# Patient Record
Sex: MALE | Race: BLACK OR AFRICAN AMERICAN | NOT HISPANIC OR LATINO | Employment: UNEMPLOYED | URBAN - METROPOLITAN AREA
[De-identification: names, ages, dates, MRNs, and addresses within clinical notes are randomized per-mention and may not be internally consistent; named-entity substitution may affect disease eponyms.]

---

## 2021-02-14 ENCOUNTER — APPOINTMENT (OUTPATIENT)
Dept: CT IMAGING | Facility: HOSPITAL | Age: 51
End: 2021-02-14
Payer: COMMERCIAL

## 2021-02-14 ENCOUNTER — APPOINTMENT (EMERGENCY)
Dept: RADIOLOGY | Facility: HOSPITAL | Age: 51
End: 2021-02-14
Payer: COMMERCIAL

## 2021-02-14 ENCOUNTER — HOSPITAL ENCOUNTER (OUTPATIENT)
Facility: HOSPITAL | Age: 51
Setting detail: OBSERVATION
Discharge: HOME | End: 2021-02-15
Attending: EMERGENCY MEDICINE | Admitting: INTERNAL MEDICINE
Payer: COMMERCIAL

## 2021-02-14 DIAGNOSIS — R07.9 CHEST PAIN, UNSPECIFIED TYPE: Primary | ICD-10-CM

## 2021-02-14 DIAGNOSIS — Z87.898 HISTORY OF SEIZURES: Chronic | ICD-10-CM

## 2021-02-14 DIAGNOSIS — I10 HTN (HYPERTENSION): ICD-10-CM

## 2021-02-14 PROBLEM — F14.21 COCAINE USE DISORDER, SEVERE, IN EARLY REMISSION (HCC): Status: ACTIVE | Noted: 2021-02-14

## 2021-02-14 PROBLEM — R79.89 ELEVATED D-DIMER: Status: ACTIVE | Noted: 2021-02-14

## 2021-02-14 PROBLEM — J45.909 ASTHMA: Chronic | Status: ACTIVE | Noted: 2021-02-14

## 2021-02-14 PROBLEM — J45.909 ASTHMA: Status: ACTIVE | Noted: 2021-02-14

## 2021-02-14 PROBLEM — E78.5 HYPERLIPIDEMIA: Chronic | Status: ACTIVE | Noted: 2021-02-14

## 2021-02-14 LAB
ALBUMIN SERPL BCP-MCNC: 3.8 G/DL (ref 3.5–5)
ALP SERPL-CCNC: 103 U/L (ref 46–116)
ALT SERPL W P-5'-P-CCNC: 38 U/L (ref 12–78)
AMPHETAMINES SERPL QL SCN: NEGATIVE
ANION GAP SERPL CALCULATED.3IONS-SCNC: 6 MMOL/L (ref 4–13)
AST SERPL W P-5'-P-CCNC: 29 U/L (ref 5–45)
BARBITURATES UR QL: NEGATIVE
BASOPHILS # BLD AUTO: 0.04 THOUSANDS/ΜL (ref 0–0.1)
BASOPHILS NFR BLD AUTO: 1 % (ref 0–1)
BENZODIAZ UR QL: NEGATIVE
BILIRUB SERPL-MCNC: 0.37 MG/DL (ref 0.2–1)
BUN SERPL-MCNC: 12 MG/DL (ref 5–25)
CALCIUM SERPL-MCNC: 9.8 MG/DL (ref 8.3–10.1)
CHLORIDE SERPL-SCNC: 105 MMOL/L (ref 100–108)
CHOLEST SERPL-MCNC: 83 MG/DL (ref 50–200)
CO2 SERPL-SCNC: 28 MMOL/L (ref 21–32)
COCAINE UR QL: NEGATIVE
CREAT SERPL-MCNC: 0.78 MG/DL (ref 0.6–1.3)
D DIMER PPP FEU-MCNC: 0.9 UG/ML FEU
EOSINOPHIL # BLD AUTO: 0.09 THOUSAND/ΜL (ref 0–0.61)
EOSINOPHIL NFR BLD AUTO: 2 % (ref 0–6)
ERYTHROCYTE [DISTWIDTH] IN BLOOD BY AUTOMATED COUNT: 12.1 % (ref 11.6–15.1)
GFR SERPL CREATININE-BSD FRML MDRD: 106 ML/MIN/1.73SQ M
GLUCOSE SERPL-MCNC: 92 MG/DL (ref 65–140)
HCT VFR BLD AUTO: 41.1 % (ref 36.5–49.3)
HDLC SERPL-MCNC: 39 MG/DL
HGB BLD-MCNC: 13.7 G/DL (ref 12–17)
IMM GRANULOCYTES # BLD AUTO: 0.02 THOUSAND/UL (ref 0–0.2)
IMM GRANULOCYTES NFR BLD AUTO: 0 % (ref 0–2)
LDLC SERPL CALC-MCNC: 30 MG/DL (ref 0–100)
LYMPHOCYTES # BLD AUTO: 2.02 THOUSANDS/ΜL (ref 0.6–4.47)
LYMPHOCYTES NFR BLD AUTO: 36 % (ref 14–44)
MCH RBC QN AUTO: 30.1 PG (ref 26.8–34.3)
MCHC RBC AUTO-ENTMCNC: 33.3 G/DL (ref 31.4–37.4)
MCV RBC AUTO: 90 FL (ref 82–98)
METHADONE UR QL: NEGATIVE
MONOCYTES # BLD AUTO: 0.4 THOUSAND/ΜL (ref 0.17–1.22)
MONOCYTES NFR BLD AUTO: 7 % (ref 4–12)
NEUTROPHILS # BLD AUTO: 2.99 THOUSANDS/ΜL (ref 1.85–7.62)
NEUTS SEG NFR BLD AUTO: 54 % (ref 43–75)
NRBC BLD AUTO-RTO: 0 /100 WBCS
OPIATES UR QL SCN: NEGATIVE
OXYCODONE+OXYMORPHONE UR QL SCN: NEGATIVE
PCP UR QL: NEGATIVE
PLATELET # BLD AUTO: 176 THOUSANDS/UL (ref 149–390)
PMV BLD AUTO: 11.2 FL (ref 8.9–12.7)
POTASSIUM SERPL-SCNC: 4.1 MMOL/L (ref 3.5–5.3)
PROT SERPL-MCNC: 7.4 G/DL (ref 6.4–8.2)
RBC # BLD AUTO: 4.55 MILLION/UL (ref 3.88–5.62)
SODIUM SERPL-SCNC: 139 MMOL/L (ref 136–145)
THC UR QL: NEGATIVE
TRIGL SERPL-MCNC: 72 MG/DL
TROPONIN I SERPL-MCNC: <0.02 NG/ML
TROPONIN I SERPL-MCNC: <0.02 NG/ML
VALPROATE SERPL-MCNC: 22 UG/ML (ref 50–100)
WBC # BLD AUTO: 5.56 THOUSAND/UL (ref 4.31–10.16)

## 2021-02-14 PROCEDURE — 80061 LIPID PANEL: CPT | Performed by: INTERNAL MEDICINE

## 2021-02-14 PROCEDURE — 85379 FIBRIN DEGRADATION QUANT: CPT | Performed by: INTERNAL MEDICINE

## 2021-02-14 PROCEDURE — 99220 PR INITIAL OBSERVATION CARE/DAY 70 MINUTES: CPT | Performed by: INTERNAL MEDICINE

## 2021-02-14 PROCEDURE — 36415 COLL VENOUS BLD VENIPUNCTURE: CPT | Performed by: EMERGENCY MEDICINE

## 2021-02-14 PROCEDURE — 99285 EMERGENCY DEPT VISIT HI MDM: CPT | Performed by: EMERGENCY MEDICINE

## 2021-02-14 PROCEDURE — 80307 DRUG TEST PRSMV CHEM ANLYZR: CPT | Performed by: INTERNAL MEDICINE

## 2021-02-14 PROCEDURE — 80164 ASSAY DIPROPYLACETIC ACD TOT: CPT | Performed by: EMERGENCY MEDICINE

## 2021-02-14 PROCEDURE — 71045 X-RAY EXAM CHEST 1 VIEW: CPT

## 2021-02-14 PROCEDURE — 84484 ASSAY OF TROPONIN QUANT: CPT | Performed by: PSYCHIATRY & NEUROLOGY

## 2021-02-14 PROCEDURE — 93005 ELECTROCARDIOGRAM TRACING: CPT

## 2021-02-14 PROCEDURE — G1004 CDSM NDSC: HCPCS

## 2021-02-14 PROCEDURE — 85025 COMPLETE CBC W/AUTO DIFF WBC: CPT | Performed by: EMERGENCY MEDICINE

## 2021-02-14 PROCEDURE — 99285 EMERGENCY DEPT VISIT HI MDM: CPT

## 2021-02-14 PROCEDURE — 84484 ASSAY OF TROPONIN QUANT: CPT | Performed by: EMERGENCY MEDICINE

## 2021-02-14 PROCEDURE — 71275 CT ANGIOGRAPHY CHEST: CPT

## 2021-02-14 PROCEDURE — 80053 COMPREHEN METABOLIC PANEL: CPT | Performed by: EMERGENCY MEDICINE

## 2021-02-14 RX ORDER — ACETAMINOPHEN 325 MG/1
650 TABLET ORAL EVERY 6 HOURS PRN
Status: DISCONTINUED | OUTPATIENT
Start: 2021-02-14 | End: 2021-02-15 | Stop reason: HOSPADM

## 2021-02-14 RX ORDER — CARVEDILOL 3.12 MG/1
3.12 TABLET ORAL
Status: DISCONTINUED | OUTPATIENT
Start: 2021-02-15 | End: 2021-02-15 | Stop reason: HOSPADM

## 2021-02-14 RX ORDER — ONDANSETRON 2 MG/ML
4 INJECTION INTRAMUSCULAR; INTRAVENOUS EVERY 6 HOURS PRN
Status: DISCONTINUED | OUTPATIENT
Start: 2021-02-14 | End: 2021-02-15 | Stop reason: HOSPADM

## 2021-02-14 RX ORDER — ATORVASTATIN CALCIUM 40 MG/1
40 TABLET, FILM COATED ORAL
Status: DISCONTINUED | OUTPATIENT
Start: 2021-02-14 | End: 2021-02-15 | Stop reason: HOSPADM

## 2021-02-14 RX ORDER — ASPIRIN 81 MG/1
324 TABLET, CHEWABLE ORAL ONCE
Status: COMPLETED | OUTPATIENT
Start: 2021-02-14 | End: 2021-02-14

## 2021-02-14 RX ORDER — VALPROIC ACID 250 MG/1
250 CAPSULE, LIQUID FILLED ORAL 2 TIMES DAILY
Status: ON HOLD | COMMUNITY
End: 2021-02-15 | Stop reason: SDUPTHER

## 2021-02-14 RX ORDER — CARBAMAZEPINE 200 MG/1
200 TABLET ORAL DAILY
COMMUNITY

## 2021-02-14 RX ORDER — LEVETIRACETAM 500 MG/1
750 TABLET ORAL 2 TIMES DAILY
Status: DISCONTINUED | OUTPATIENT
Start: 2021-02-14 | End: 2021-02-15 | Stop reason: HOSPADM

## 2021-02-14 RX ORDER — ATORVASTATIN CALCIUM 40 MG/1
40 TABLET, FILM COATED ORAL
COMMUNITY

## 2021-02-14 RX ORDER — CARVEDILOL 3.12 MG/1
3.12 TABLET ORAL DAILY
COMMUNITY

## 2021-02-14 RX ORDER — VALPROIC ACID 250 MG/1
250 CAPSULE, LIQUID FILLED ORAL 2 TIMES DAILY
Status: DISCONTINUED | OUTPATIENT
Start: 2021-02-14 | End: 2021-02-15 | Stop reason: HOSPADM

## 2021-02-14 RX ORDER — LOSARTAN POTASSIUM 25 MG/1
25 TABLET ORAL DAILY
COMMUNITY

## 2021-02-14 RX ORDER — CARBAMAZEPINE 200 MG/1
200 TABLET ORAL DAILY
Status: DISCONTINUED | OUTPATIENT
Start: 2021-02-15 | End: 2021-02-15 | Stop reason: HOSPADM

## 2021-02-14 RX ORDER — ASPIRIN 81 MG/1
81 TABLET, CHEWABLE ORAL DAILY
Status: DISCONTINUED | OUTPATIENT
Start: 2021-02-15 | End: 2021-02-15 | Stop reason: HOSPADM

## 2021-02-14 RX ORDER — LORAZEPAM 2 MG/ML
1 INJECTION INTRAMUSCULAR ONCE
Status: COMPLETED | OUTPATIENT
Start: 2021-02-14 | End: 2021-02-14

## 2021-02-14 RX ORDER — LEVETIRACETAM 750 MG/1
750 TABLET ORAL 2 TIMES DAILY
COMMUNITY

## 2021-02-14 RX ORDER — LOSARTAN POTASSIUM 25 MG/1
25 TABLET ORAL DAILY
Status: DISCONTINUED | OUTPATIENT
Start: 2021-02-15 | End: 2021-02-15 | Stop reason: HOSPADM

## 2021-02-14 RX ADMIN — ASPIRIN 324 MG: 81 TABLET, CHEWABLE ORAL at 14:28

## 2021-02-14 RX ADMIN — VALPROIC ACID 250 MG: 250 CAPSULE ORAL at 20:49

## 2021-02-14 RX ADMIN — IOHEXOL 85 ML: 350 INJECTION, SOLUTION INTRAVENOUS at 21:15

## 2021-02-14 RX ADMIN — LEVETIRACETAM 750 MG: 250 TABLET, FILM COATED ORAL at 20:49

## 2021-02-14 RX ADMIN — ATORVASTATIN CALCIUM 40 MG: 40 TABLET, FILM COATED ORAL at 21:44

## 2021-02-14 NOTE — ASSESSMENT & PLAN NOTE
Home medications include Keppra and valproic acid, unknown dosages    Called caregiver, who stated Keppra 750 mg b i d , valproic acid 250 mg b i d , carbamazepine 200 mg daily    Plan:   Continue home medications

## 2021-02-14 NOTE — ASSESSMENT & PLAN NOTE
PMHx of seizures, HTN, and asthma, presented with, far left-sided, sharp, 10/10, chest pain that radiated to back and resolved after 20 minutes  Occurred in the a m , while sitting at [employer], no strenuous activity, no associated symptoms  Similar episodes twice last week  Patient stated he pulled a muscle couple weeks ago  Initial troponin negative      Plan:  · 48 hour telemetry obs  · Trend troponins  · Stress test   · Recommend baby aspirin  · Order lipid panel, hemoglobin A1c, and D-dimer

## 2021-02-14 NOTE — ED PROVIDER NOTES
History  Chief Complaint   Patient presents with    Chest Pain     Per EMS pt found in [employer] clutching his chest  While walking to the ambulance pt began screaming and screeching  Pt given 2mg of ativan by EMS  Pt reports resolved cp  Denies history   Anxiety     HPI    55 yo M hx of seizures on valproic acid, presents to the ed for eval of chest pain  Occurred just prior to arrival  Lasted 20 minutes  Patient clutching his chest at [employer]  EMS called  No seizure like activity  Pain currently not present  Sharp localized retrosternal  No current sob  States he is asymptomatic  Did have similar pain twice over the past week  No n/v  No abd pain  Has a prior hx of drug abuse, though currently denies any drug abuse, states he has been "clean for awhile " Lives in [place], no family at home  States he has a "small heart "  Family hx of heart disease  Smoked for the past several years, states he quit in march of last year  None       Past Medical History:   Diagnosis Date    Hypertension     Seizure [facility])        History reviewed  No pertinent surgical history  History reviewed  No pertinent family history  I have reviewed and agree with the history as documented  E-Cigarette/Vaping     E-Cigarette/Vaping Substances     Social History     Tobacco Use    Smoking status: Not on file   Substance Use Topics    Alcohol use: Not Currently    Drug use: Not Currently       Review of Systems   Constitutional: Negative for chills, fatigue and fever  HENT: Negative for nosebleeds and sore throat  Eyes: Negative for redness and visual disturbance  Respiratory: Negative for shortness of breath and wheezing  Cardiovascular: Positive for chest pain  Negative for palpitations  Gastrointestinal: Negative for abdominal pain and diarrhea  Endocrine: Negative for polyuria  Genitourinary: Negative for difficulty urinating and testicular pain     Musculoskeletal: Negative for back pain and neck stiffness  Skin: Negative for rash and wound  Neurological: Negative for seizures, speech difficulty and headaches  Psychiatric/Behavioral: Negative for dysphoric mood and hallucinations  The patient is nervous/anxious  All other systems reviewed and are negative  Physical Exam  Physical Exam  Vitals signs and nursing note reviewed  Constitutional:       Appearance: He is well-developed  He is obese  HENT:      Head: Normocephalic and atraumatic  Right Ear: External ear normal       Left Ear: External ear normal    Eyes:      Conjunctiva/sclera: Conjunctivae normal    Neck:      Musculoskeletal: Normal range of motion  Cardiovascular:      Rate and Rhythm: Normal rate and regular rhythm  Heart sounds: Normal heart sounds  Pulmonary:      Effort: Pulmonary effort is normal       Breath sounds: Normal breath sounds  No wheezing  Chest:      Chest wall: No tenderness  Abdominal:      General: Bowel sounds are normal       Palpations: Abdomen is soft  Tenderness: There is no abdominal tenderness  There is no guarding  Musculoskeletal: Normal range of motion  Skin:     General: Skin is warm and dry  Findings: No rash  Neurological:      Mental Status: He is alert and oriented to person, place, and time  Cranial Nerves: No cranial nerve deficit  Sensory: No sensory deficit  Motor: No abnormal muscle tone        Coordination: Coordination normal          Vital Signs  ED Triage Vitals [02/14/21 1357]   Temperature Pulse Respirations Blood Pressure SpO2   98 7 °F (37 1 °C) 64 16 112/67 100 %      Temp Source Heart Rate Source Patient Position - Orthostatic VS BP Location FiO2 (%)   Oral Monitor Lying Right arm --      Pain Score       No Pain           Vitals:    02/14/21 1357   BP: 112/67   Pulse: 64   Patient Position - Orthostatic VS: Lying         Visual Acuity      ED Medications  Medications   LORazepam (FOR EMS ONLY) (ATIVAN) 2 mg/mL injection 2 mg (has no administration in time range)   aspirin chewable tablet 324 mg (324 mg Oral Given 2/14/21 1428)       Diagnostic Studies  Results Reviewed     Procedure Component Value Units Date/Time    Rapid drug screen, urine [[id number]]     Lab Status: No result Specimen: Urine     Troponin I [[id number]]  (Normal) Collected: 02/14/21 1454    Lab Status: Final result Specimen: Blood from Arm, Left Updated: 02/14/21 1542     Troponin I <0 02 ng/mL     Comprehensive metabolic panel [[id number]] Collected: 02/14/21 1454    Lab Status: Final result Specimen: Blood from Arm, Left Updated: 02/14/21 1541     Sodium 139 mmol/L      Potassium 4 1 mmol/L      Chloride 105 mmol/L      CO2 28 mmol/L      ANION GAP 6 mmol/L      BUN 12 mg/dL      Creatinine 0 78 mg/dL      Glucose 92 mg/dL      Calcium 9 8 mg/dL      AST 29 U/L      ALT 38 U/L      Alkaline Phosphatase 103 U/L      Total Protein 7 4 g/dL      Albumin 3 8 g/dL      Total Bilirubin 0 37 mg/dL      eGFR 106 ml/min/1 73sq m     Narrative:      [place] guidelines for Chronic Kidney Disease (CKD):     Stage 1 with normal or high GFR (GFR > 90 mL/min/1 73 square meters)    Stage 2 Mild CKD (GFR = 60-89 mL/min/1 73 square meters)    Stage 3A Moderate CKD (GFR = 45-59 mL/min/1 73 square meters)    Stage 3B Moderate CKD (GFR = 30-44 mL/min/1 73 square meters)    Stage 4 Severe CKD (GFR = 15-29 mL/min/1 73 square meters)    Stage 5 End Stage CKD (GFR <15 mL/min/1 73 square meters)  Note: GFR calculation is accurate only with a steady state creatinine    Valproic acid level, total [[id number]]  (Abnormal) Collected: 02/14/21 1454    Lab Status: Final result Specimen: Blood from Arm, Left Updated: 02/14/21 1522     Valproic Acid, Total 22 ug/mL     CBC and differential [[id number]] Collected: 02/14/21 1454    Lab Status: Final result Specimen: Blood from Arm, Left Updated: 02/14/21 1510     WBC 5 56 Thousand/uL      RBC 4 55 Million/uL      Hemoglobin 13 7 g/dL      Hematocrit 41 1 %      MCV 90 fL      MCH 30 1 pg      MCHC 33 3 g/dL      RDW 12 1 %      MPV 11 2 fL      Platelets 585 Thousands/uL      nRBC 0 /100 WBCs      Neutrophils Relative 54 %      Immat GRANS % 0 %      Lymphocytes Relative 36 %      Monocytes Relative 7 %      Eosinophils Relative 2 %      Basophils Relative 1 %      Neutrophils Absolute 2 99 Thousands/µL      Immature Grans Absolute 0 02 Thousand/uL      Lymphocytes Absolute 2 02 Thousands/µL      Monocytes Absolute 0 40 Thousand/µL      Eosinophils Absolute 0 09 Thousand/µL      Basophils Absolute 0 04 Thousands/µL                  XR chest 1 view portable    (Results Pending)              Procedures  Procedures       ED from 2/14/2021 in [facility] Emergency Department  02/14/21  1420          Heart Score Risk Calculator   History  2          ECG  1          Age  1          Risk Factors  1          Troponin  0          HEART Score  5 (calculated)                ED Course  ED Course as of Feb 14 1716   Sun Feb 14, 2021   1543 Troponin I: <0 02   1543 EKG: multiple T wave inversions II III, V3 - V6                MDM    55 yo M presenting with chest pain  Heart score 5  Patient had neg trop, but multiple t wave inversions in V3-V5  No prior ekg  Will require admission, chest pain obs  Admitted to medicine for further management  Disposition  Final diagnoses:   Chest pain, unspecified type     Time reflects when diagnosis was documented in both MDM as applicable and the Disposition within this note     Time User Action Codes Description Comment    2/14/2021  3:53 PM Brayan Scott Add [R07 9] Chest pain, unspecified type       ED Disposition     ED Disposition Condition Date/Time Comment    Admit Stable Laquita Feb 14, 2021  3:53 PM Case was discussed with CONNOR and the patient's admission status was agreed to be Admission Status: observation status to the service of Dr Teetee Saba          Follow-up Information    None Patient's Medications    No medications on file     No discharge procedures on file      PDMP Review     None          ED Provider  Electronically Signed by           Quita Dandy, MD  02/14/21 6859

## 2021-02-14 NOTE — ASSESSMENT & PLAN NOTE
Home medications include "BP and water pill"  Caregivers states carvedilol 3 125 mg daily and losartan 25 mg daily    Plan:   Continue home medications

## 2021-02-14 NOTE — ASSESSMENT & PLAN NOTE
Patient stated no medications currently    Plan:   Call made to pharmacy, unable to reach a pharmacist at this hour  Will need to call "[address] [place] [address] in [facility]" - [phone]?

## 2021-02-14 NOTE — H&P
H&P- Hutchinskalyn Ruano 1970, 54 y o  male MRN: [id number]    Unit/Bed#: ED 15 Encounter: [id number]    Primary Care Provider: No primary care provider on file  Date and time admitted to hospital: 2/14/2021  1:54 PM        * Chest pain  Assessment & Plan  PMHx of seizures, HTN, and asthma, presented with, far left-sided, sharp, 10/10, chest pain that radiated to back and resolved after 20 minutes  Occurred in the a m , while sitting at [employer], no strenuous activity, no associated symptoms  Similar episodes twice last week  Patient stated he pulled a muscle couple weeks ago  Initial troponin negative  Plan:  · 48 hour telemetry obs  · Trend troponins  · Stress test   · Recommend baby aspirin  · Order lipid panel, hemoglobin A1c, and D-dimer    History of seizures  Assessment & Plan  Home medications include Keppra and valproic acid, unknown dosages  Called caregiver, who stated Keppra 750 mg b i d , valproic acid 250 mg b i d , carbamazepine 200 mg daily    Plan:   Continue home medications    Hyperlipidemia  Assessment & Plan  Home medication atorvastatin 40 mg at bedtime, will continue    Asthma  Assessment & Plan  Patient stated no medications currently    Plan:   Call made to pharmacy, unable to reach a pharmacist at this hour  Will need to call "[address] [place] [address] in [facility]" - [phone]? HTN (hypertension)  Assessment & Plan  Home medications include "BP and water pill"  Caregivers states carvedilol 3 125 mg daily and losartan 25 mg daily    Plan:   Continue home medications    Cocaine use disorder, severe, in early remission [facility])  Assessment & Plan  Long crack cocaine abuse history, stopped 3 months ago, and 1 past psychiatric hospitalization after suicidal ideations to overdose on medications  Caregiver stated no psychiatric medications or diagnoses  Patient currently does not appear to be a harm to himself or to others and consents for safety      Elevated d-dimer  Assessment & Plan  Ordered CTA PE study      VTE Prophylaxis: low VTE score  / reason for no mechanical VTE prophylaxis low VTE score and order for ambulate patient and activity as tolerated   Code Status: Level 1  POLST: There is no POLST form on file for this patient (pre-hospital)    Anticipated Length of Stay:  Patient will be admitted on an Observation basis with an anticipated length of stay of  <2 midnights  Justification for Hospital Stay: chest pain    Chief Complaint:   "My chest started hurting "    History of Present Illness:    Vanessa Foley is a 54 y o   male, with a past medical history of seizures, hypertension, asthma, and crack cocaine use disorder, who presents with chest pain  The patient stated this chest pain began in the morning while at [employer] and while just sitting  The patient described the pain as sharp, 10/10, localized to the far left hand radiating to the back  The patient stated that the chest pain self resolved after 20 minutes  Patient reports similar episodes twice in the past week  Patient reports there were no inciting events, and nothing that made the pain worse or better  Patient stated he may have pulled a muscle couple weeks ago  Patient quit smoking in March 2020  Patient is unsure of his pharmacy location and stated it is just [facility] in [place]  Patient is unsure of the names and dosages of his medications for seizures and hypertension  Patient reported no allergies  Patient was hypersomnolent and could not tolerate much of the interview, due to recently being administered 2 mg Ativan by EMS  Medications and past medical history were confirmed with Caregiver Patricia Althea, number in chart  Caregiver stated he begin taking care of the patient after the patient stopped abusing crack cocaine  Review of Systems:    Review of Systems   Constitutional: Positive for fatigue  Negative for activity change, appetite change, chills, diaphoresis and fever  HENT: Negative for ear pain and sore throat  Eyes: Negative for pain and visual disturbance  Respiratory: Negative for cough and shortness of breath  Cardiovascular: Negative for chest pain and palpitations  Gastrointestinal: Negative for abdominal pain, constipation, diarrhea and vomiting  Endocrine: Negative for polyuria  Genitourinary: Negative for dysuria, flank pain and hematuria  Musculoskeletal: Negative for arthralgias, back pain and neck pain  Skin: Negative for color change and rash  Neurological: Negative for seizures, syncope and headaches  Psychiatric/Behavioral: Negative for dysphoric mood  The patient is not nervous/anxious  All other systems reviewed and are negative  Past Medical and Surgical History:     Past Medical History:   Diagnosis Date    Asthma     Hypertension     Seizure [facility])        History reviewed  No pertinent surgical history  Meds/Allergies:    Prior to Admission medications    Not on File     I have reviewed home medications with patient family member      Allergies: No Known Allergies    Social History:     Marital Status: Single   Occupation:  Unknown  Patient Pre-hospital Living Situation:  Independent  Patient Pre-hospital Level of Mobility:  Independent  Patient Pre-hospital Diet Restrictions:  Cardiac diet  Substance Use History:   Social History     Substance and Sexual Activity   Alcohol Use Not Currently     Social History     Tobacco Use   Smoking Status Former Smoker    Packs/day: 1 00    Years: 16 00    Pack years: 16 00     Social History     Substance and Sexual Activity   Drug Use Not Currently       Family History:    Family History   Problem Relation Age of Onset    Heart disease Mother     Heart disease Father        Physical Exam:     Vitals:   Blood Pressure: 121/67 (02/14/21 1919)  Pulse: 60 (02/14/21 1919)  Temperature: 98 7 °F (37 1 °C) (02/14/21 1357)  Temp Source: Oral (02/14/21 1357)  Respirations: 16 (02/14/21 1919)  SpO2: 100 % (02/14/21 1919)    Physical Exam  Vitals signs reviewed  Constitutional:       General: He is not in acute distress  Appearance: Normal appearance  He is well-developed  He is obese  He is not ill-appearing, toxic-appearing or diaphoretic  HENT:      Head: Normocephalic and atraumatic  Right Ear: External ear normal       Left Ear: External ear normal       Nose: Nose normal  No congestion  Mouth/Throat:      Mouth: Mucous membranes are moist       Pharynx: Oropharynx is clear  Eyes:      General: No scleral icterus  Right eye: No discharge  Left eye: No discharge  Extraocular Movements: Extraocular movements intact  Conjunctiva/sclera: Conjunctivae normal       Pupils: Pupils are equal, round, and reactive to light  Neck:      Musculoskeletal: Normal range of motion and neck supple  Thyroid: No thyromegaly  Vascular: No JVD  Cardiovascular:      Rate and Rhythm: Normal rate and regular rhythm  Heart sounds: Normal heart sounds  No murmur  No friction rub  No gallop  Pulmonary:      Effort: Pulmonary effort is normal  No respiratory distress  Breath sounds: Normal breath sounds  No wheezing or rales  Abdominal:      General: Bowel sounds are normal  There is no distension  Palpations: Abdomen is soft  There is no mass  Tenderness: There is no abdominal tenderness  There is no guarding  Musculoskeletal: Normal range of motion  General: No swelling or tenderness  Right lower leg: No edema  Left lower leg: No edema  Lymphadenopathy:      Cervical: No cervical adenopathy  Skin:     General: Skin is warm and dry  Capillary Refill: Capillary refill takes less than 2 seconds  Coloration: Skin is not pale  Findings: No erythema  Neurological:      General: No focal deficit present  Mental Status: He is alert and oriented to person, place, and time  Motor: No weakness  Gait: Gait normal    Psychiatric:         Mood and Affect: Mood normal          Behavior: Behavior normal          Thought Content: Thought content normal          Judgment: Judgment normal              Additional Data:     Lab Results: I have personally reviewed pertinent reports  Results from last 7 days   Lab Units 02/14/21  1454   WBC Thousand/uL 5 56   HEMOGLOBIN g/dL 13 7   HEMATOCRIT % 41 1   PLATELETS Thousands/uL 176   NEUTROS PCT % 54   LYMPHS PCT % 36   MONOS PCT % 7   EOS PCT % 2     Results from last 7 days   Lab Units 02/14/21  1454   POTASSIUM mmol/L 4 1   CHLORIDE mmol/L 105   CO2 mmol/L 28   BUN mg/dL 12   CREATININE mg/dL 0 78   CALCIUM mg/dL 9 8   ALK PHOS U/L 103   ALT U/L 38   AST U/L 29           Imaging: I have personally reviewed pertinent films in PACS    No results found  EKG, Pathology, and Other Studies Reviewed on Admission:   · EKG:  T-wave inversions in leads 2, 3, V3-V6    Epic / Care Everywhere Records Reviewed: Yes, no history in chart     ** Please Note: This note has been constructed using a voice recognition system   **

## 2021-02-15 ENCOUNTER — APPOINTMENT (OUTPATIENT)
Dept: NUCLEAR MEDICINE | Facility: HOSPITAL | Age: 51
End: 2021-02-15
Payer: COMMERCIAL

## 2021-02-15 ENCOUNTER — APPOINTMENT (OUTPATIENT)
Dept: NON INVASIVE DIAGNOSTICS | Facility: HOSPITAL | Age: 51
End: 2021-02-15
Payer: COMMERCIAL

## 2021-02-15 VITALS
WEIGHT: 177.47 LBS | OXYGEN SATURATION: 97 % | DIASTOLIC BLOOD PRESSURE: 59 MMHG | RESPIRATION RATE: 18 BRPM | HEART RATE: 68 BPM | TEMPERATURE: 98.5 F | HEIGHT: 69 IN | SYSTOLIC BLOOD PRESSURE: 121 MMHG | BODY MASS INDEX: 26.29 KG/M2

## 2021-02-15 LAB
ANION GAP SERPL CALCULATED.3IONS-SCNC: 8 MMOL/L (ref 4–13)
ATRIAL RATE: 67 BPM
BASOPHILS # BLD AUTO: 0.03 THOUSANDS/ΜL (ref 0–0.1)
BASOPHILS NFR BLD AUTO: 1 % (ref 0–1)
BUN SERPL-MCNC: 12 MG/DL (ref 5–25)
CALCIUM SERPL-MCNC: 9.8 MG/DL (ref 8.3–10.1)
CHLORIDE SERPL-SCNC: 106 MMOL/L (ref 100–108)
CHOLEST SERPL-MCNC: 77 MG/DL (ref 50–200)
CO2 SERPL-SCNC: 28 MMOL/L (ref 21–32)
CREAT SERPL-MCNC: 0.71 MG/DL (ref 0.6–1.3)
D DIMER PPP FEU-MCNC: 1.05 UG/ML FEU
EOSINOPHIL # BLD AUTO: 0.08 THOUSAND/ΜL (ref 0–0.61)
EOSINOPHIL NFR BLD AUTO: 2 % (ref 0–6)
ERYTHROCYTE [DISTWIDTH] IN BLOOD BY AUTOMATED COUNT: 12.2 % (ref 11.6–15.1)
EST. AVERAGE GLUCOSE BLD GHB EST-MCNC: 111 MG/DL
FLUAV RNA RESP QL NAA+PROBE: NEGATIVE
FLUBV RNA RESP QL NAA+PROBE: NEGATIVE
GFR SERPL CREATININE-BSD FRML MDRD: 127 ML/MIN/1.73SQ M
GLUCOSE SERPL-MCNC: 90 MG/DL (ref 65–140)
HBA1C MFR BLD: 5.5 %
HCT VFR BLD AUTO: 41 % (ref 36.5–49.3)
HDLC SERPL-MCNC: 37 MG/DL
HGB BLD-MCNC: 13.5 G/DL (ref 12–17)
IMM GRANULOCYTES # BLD AUTO: 0.01 THOUSAND/UL (ref 0–0.2)
IMM GRANULOCYTES NFR BLD AUTO: 0 % (ref 0–2)
LDLC SERPL CALC-MCNC: 29 MG/DL (ref 0–100)
LYMPHOCYTES # BLD AUTO: 1.94 THOUSANDS/ΜL (ref 0.6–4.47)
LYMPHOCYTES NFR BLD AUTO: 42 % (ref 14–44)
MCH RBC QN AUTO: 29.9 PG (ref 26.8–34.3)
MCHC RBC AUTO-ENTMCNC: 32.9 G/DL (ref 31.4–37.4)
MCV RBC AUTO: 91 FL (ref 82–98)
MONOCYTES # BLD AUTO: 0.34 THOUSAND/ΜL (ref 0.17–1.22)
MONOCYTES NFR BLD AUTO: 7 % (ref 4–12)
NEUTROPHILS # BLD AUTO: 2.18 THOUSANDS/ΜL (ref 1.85–7.62)
NEUTS SEG NFR BLD AUTO: 48 % (ref 43–75)
NONHDLC SERPL-MCNC: 40 MG/DL
NRBC BLD AUTO-RTO: 0 /100 WBCS
P AXIS: 44 DEGREES
PLATELET # BLD AUTO: 183 THOUSANDS/UL (ref 149–390)
PMV BLD AUTO: 10.8 FL (ref 8.9–12.7)
POTASSIUM SERPL-SCNC: 3.8 MMOL/L (ref 3.5–5.3)
PR INTERVAL: 184 MS
PROCALCITONIN SERPL-MCNC: <0.05 NG/ML
QRS AXIS: -4 DEGREES
QRSD INTERVAL: 90 MS
QT INTERVAL: 394 MS
QTC INTERVAL: 416 MS
RBC # BLD AUTO: 4.51 MILLION/UL (ref 3.88–5.62)
RSV RNA RESP QL NAA+PROBE: NEGATIVE
SARS-COV-2 RNA RESP QL NAA+PROBE: NEGATIVE
SODIUM SERPL-SCNC: 142 MMOL/L (ref 136–145)
T WAVE AXIS: -22 DEGREES
TRIGL SERPL-MCNC: 57 MG/DL
TROPONIN I SERPL-MCNC: 0.02 NG/ML
TROPONIN I SERPL-MCNC: <0.02 NG/ML
VALPROATE SERPL-MCNC: 89 UG/ML (ref 50–100)
VENTRICULAR RATE: 67 BPM
WBC # BLD AUTO: 4.58 THOUSAND/UL (ref 4.31–10.16)

## 2021-02-15 PROCEDURE — 78452 HT MUSCLE IMAGE SPECT MULT: CPT | Performed by: INTERNAL MEDICINE

## 2021-02-15 PROCEDURE — 93017 CV STRESS TEST TRACING ONLY: CPT

## 2021-02-15 PROCEDURE — 84484 ASSAY OF TROPONIN QUANT: CPT | Performed by: INTERNAL MEDICINE

## 2021-02-15 PROCEDURE — 93018 CV STRESS TEST I&R ONLY: CPT | Performed by: INTERNAL MEDICINE

## 2021-02-15 PROCEDURE — 80164 ASSAY DIPROPYLACETIC ACD TOT: CPT | Performed by: INTERNAL MEDICINE

## 2021-02-15 PROCEDURE — G1004 CDSM NDSC: HCPCS

## 2021-02-15 PROCEDURE — 99217 PR OBSERVATION CARE DISCHARGE MANAGEMENT: CPT | Performed by: INTERNAL MEDICINE

## 2021-02-15 PROCEDURE — A9502 TC99M TETROFOSMIN: HCPCS

## 2021-02-15 PROCEDURE — 93016 CV STRESS TEST SUPVJ ONLY: CPT | Performed by: INTERNAL MEDICINE

## 2021-02-15 PROCEDURE — 78452 HT MUSCLE IMAGE SPECT MULT: CPT

## 2021-02-15 PROCEDURE — 85379 FIBRIN DEGRADATION QUANT: CPT | Performed by: INTERNAL MEDICINE

## 2021-02-15 PROCEDURE — 83036 HEMOGLOBIN GLYCOSYLATED A1C: CPT | Performed by: INTERNAL MEDICINE

## 2021-02-15 PROCEDURE — 36415 COLL VENOUS BLD VENIPUNCTURE: CPT | Performed by: PSYCHIATRY & NEUROLOGY

## 2021-02-15 PROCEDURE — 93306 TTE W/DOPPLER COMPLETE: CPT

## 2021-02-15 PROCEDURE — 80061 LIPID PANEL: CPT | Performed by: INTERNAL MEDICINE

## 2021-02-15 PROCEDURE — 0241U HB NFCT DS VIR RESP RNA 4 TRGT: CPT | Performed by: INTERNAL MEDICINE

## 2021-02-15 PROCEDURE — 93306 TTE W/DOPPLER COMPLETE: CPT | Performed by: INTERNAL MEDICINE

## 2021-02-15 PROCEDURE — 93010 ELECTROCARDIOGRAM REPORT: CPT | Performed by: INTERNAL MEDICINE

## 2021-02-15 PROCEDURE — 93005 ELECTROCARDIOGRAM TRACING: CPT

## 2021-02-15 PROCEDURE — 84484 ASSAY OF TROPONIN QUANT: CPT | Performed by: PSYCHIATRY & NEUROLOGY

## 2021-02-15 PROCEDURE — 84145 PROCALCITONIN (PCT): CPT | Performed by: INTERNAL MEDICINE

## 2021-02-15 PROCEDURE — 85025 COMPLETE CBC W/AUTO DIFF WBC: CPT | Performed by: INTERNAL MEDICINE

## 2021-02-15 PROCEDURE — 99204 OFFICE O/P NEW MOD 45 MIN: CPT | Performed by: INTERNAL MEDICINE

## 2021-02-15 PROCEDURE — 80048 BASIC METABOLIC PNL TOTAL CA: CPT | Performed by: INTERNAL MEDICINE

## 2021-02-15 RX ORDER — ASPIRIN 81 MG/1
81 TABLET, CHEWABLE ORAL DAILY
Refills: 0
Start: 2021-02-16 | End: 2021-03-18

## 2021-02-15 RX ORDER — ASPIRIN 81 MG/1
81 TABLET, CHEWABLE ORAL DAILY
Refills: 0
Start: 2021-02-16 | End: 2021-02-15

## 2021-02-15 RX ORDER — VALPROIC ACID 250 MG/1
500 CAPSULE, LIQUID FILLED ORAL 2 TIMES DAILY
Refills: 0
Start: 2021-02-15

## 2021-02-15 RX ORDER — VALPROIC ACID 250 MG/1
1250 CAPSULE, LIQUID FILLED ORAL ONCE
Status: COMPLETED | OUTPATIENT
Start: 2021-02-15 | End: 2021-02-15

## 2021-02-15 RX ADMIN — LEVETIRACETAM 750 MG: 250 TABLET, FILM COATED ORAL at 18:17

## 2021-02-15 RX ADMIN — REGADENOSON 0.4 MG: 0.08 INJECTION, SOLUTION INTRAVENOUS at 11:27

## 2021-02-15 RX ADMIN — LEVETIRACETAM 750 MG: 250 TABLET, FILM COATED ORAL at 09:19

## 2021-02-15 RX ADMIN — VALPROIC ACID 1250 MG: 250 CAPSULE ORAL at 14:21

## 2021-02-15 RX ADMIN — CARBAMAZEPINE 200 MG: 200 TABLET ORAL at 09:20

## 2021-02-15 RX ADMIN — VALPROIC ACID 250 MG: 250 CAPSULE ORAL at 18:17

## 2021-02-15 RX ADMIN — ASPIRIN 81 MG: 81 TABLET, CHEWABLE ORAL at 09:19

## 2021-02-15 RX ADMIN — VALPROIC ACID 250 MG: 250 CAPSULE ORAL at 09:19

## 2021-02-15 NOTE — CONSULTS
Consultation - Cardiology Team One  Marko Waldron 54 y o  male MRN: [id number]  Unit/Bed#: S -01 Encounter: [id number]    Consults    Physician Requesting Consult: Muriel Mancini MD  Reason for Consult / Principal Problem:  Chest pain    Assessment:    1  Atypical Chest pain:  Presents with left lateral sharp 10/10 pain with radiation to the back that lasted 20 minutes while sitting at [employer]  Pain is worse with taking a deep breath and movement of his left arm that likely represents musculoskeletal pain  It is reproducible to palpation  Troponin negative x4  EKG revealed sinus rhythm with inferolateral T-wave abnormality  Echocardiogram and nuclear stress test ordered by primary team   2  Essential hypertension:  Average BP on carvedilol 3 125 mg and losartan 25 mg  3  Hyperlipidemia: TC 77, TG 57, HDL 37, on atorvastatin 40 mg daily  4  Seizure disorder:  Maintained on Keppra, valproic acid and carbamazepine  5  History of cocaine abuse:  Reports stopping 3 months ago  Tox screen negative  6  Elevated D-dimer:  CTA negative for PE       Plan/Recommendations:  · Atypical likely noncardiac chest pain  · Follow-up on nuclear stress test and echocardiogram ordered by primary team  · If studies unremarkable no further workup indicated  · Continue aspirin, beta-blocker and statin     ________________________________________________________    CC:  Chest pain      History of Present Illness   HPI: Marko Waldron is a 54y o  year old male who has seizure disorder, anxiety, essential hypertension, hyperlipidemia, history of cocaine use quitting 3 months ago presented to the emergency room at [facility] on 02/14/2021 via EMS with complaints chest pain that occurred while sitting at [employer]  Patient was noted clutching his chest with 10/10 sharp pain that radiated to his back  While walking to the ambulance patient began screaming and was given 2 mg Ativan with resolution of pain  Pain lasted approximately 20 minutes  On arrival to the ED patient's vital signs were stable with oxygen saturation 100% on room air  In the ED EKG revealed sinus rhythm with nonspecific inferolateral T-wave abnormality  Chest x-ray with no acute cardiopulmonary disease  Labs revealed stable CMP, stable CBC, negative troponin x4, TC 77, TG 57, HDL 37, LDL 29, D-dimer 0 90  Negative for COVID-19/RSV/influenza  Tox screen negative  CTA was negative for PE  An echocardiogram and nuclear stress test was ordered by primary team   Cardiology has been consulted for further evaluation management of chest pain  Home medication regimen includes atorvastatin 40 mg daily, carvedilol 3 125 mg daily, losartan 25 mg daily  Patient resting in bed during consultation and denies any current chest pain  Patient states over the last 5-6 months he will occasionally get this chest pain  He then states that actually he has been having it on and off since he was a kid  It can occur at any time  It is worse with taking a deep breath in and movement of his left arm  It is reproducible with palpation  He denies any heavy lifting a muscle strain  He denies any shortness breath, palpitations, lightheadedness, dizziness, syncope number near-syncope, orthopnea, PND or lower extremity edema  Social history:  Smokes 3 cigarettes daily  Drinks alcohol socially  History of cocaine use quitting 3 months ago  Family history:  No family history of CAD or sudden cardiac death      EKG reviewed personally: 2/15/2021-sinus rhythm at 75 beats per minute with inferolateral T-wave abnormality that is more pronounced when compared to EKG from 02/14/2021  Telemetry reviewed personally:  Normal sinus rhythm        Review of Systems   Constitution: Negative  Negative for chills  Cardiovascular: Positive for chest pain   Negative for dyspnea on exertion, leg swelling, near-syncope, orthopnea, palpitations, paroxysmal nocturnal dyspnea and syncope  Respiratory: Negative  Negative for cough, shortness of breath and wheezing  Endocrine: Negative  Hematologic/Lymphatic: Negative  Skin: Negative  Musculoskeletal: Negative  Gastrointestinal: Negative  Negative for diarrhea, nausea and vomiting  Neurological: Negative for dizziness, light-headedness and weakness  Psychiatric/Behavioral: Negative  Negative for altered mental status  All other systems reviewed and are negative  Historical Information   Past Medical History:   Diagnosis Date    Asthma     Hypertension     Seizure [facility])      History reviewed  No pertinent surgical history  Social History     Substance and Sexual Activity   Alcohol Use Not Currently     Social History     Substance and Sexual Activity   Drug Use Not Currently     Social History     Tobacco Use   Smoking Status Former Smoker    Packs/day: 1 00    Years: 16 00    Pack years: 16 00     Family History:   Family History   Problem Relation Age of Onset    Heart disease Mother     Heart disease Father        Meds/Allergies   all current active meds have been reviewed, current meds:   Current Facility-Administered Medications   Medication Dose Route Frequency    acetaminophen (TYLENOL) tablet 650 mg  650 mg Oral Q6H PRN    aspirin chewable tablet 81 mg  81 mg Oral Daily    atorvastatin (LIPITOR) tablet 40 mg  40 mg Oral HS    carBAMazepine (TEGretol) tablet 200 mg  200 mg Oral Daily    carvedilol (COREG) tablet 3 125 mg  3 125 mg Oral Daily With Breakfast    levETIRAcetam (KEPPRA) tablet 750 mg  750 mg Oral BID    losartan (COZAAR) tablet 25 mg  25 mg Oral Daily    ondansetron (ZOFRAN) injection 4 mg  4 mg Intravenous Q6H PRN    valproic acid (DEPAKENE) capsule 250 mg  250 mg Oral BID    and PTA meds:   Prior to Admission Medications   Prescriptions Last Dose Informant Patient Reported?  Taking?   atorvastatin (LIPITOR) 40 mg tablet 2/13/2021 at Unknown time [facility] Yes Yes Sig: Take 40 mg by mouth daily at bedtime   carBAMazepine (TEGretol) 200 mg tablet 2/14/2021 at Unknown time Care Giver Yes Yes   Sig: Take 200 mg by mouth daily   carvedilol (COREG) 3 125 mg tablet 2/14/2021 at Unknown time Care Giver Yes Yes   Sig: Take 3 125 mg by mouth daily   levETIRAcetam (KEPPRA) 750 mg tablet 2/14/2021 at Unknown time Care Giver Yes Yes   Sig: Take 750 mg by mouth 2 (two) times a day   losartan (COZAAR) 25 mg tablet 2/14/2021 at Unknown time Care Giver Yes Yes   Sig: Take 25 mg by mouth daily   valproic acid (DEPAKENE) 250 mg capsule 2/14/2021 at Unknown time Care Giver Yes Yes   Sig: Take 250 mg by mouth 2 (two) times a day      Facility-Administered Medications: None          No Known Allergies    Objective   Vitals: Blood pressure 91/50, pulse 65, temperature 98 3 °F (36 8 °C), temperature source Oral, resp  rate 18, height 5' 9" (1 753 m), weight 80 5 kg (177 lb 7 5 oz), SpO2 97 %  ,     Body mass index is 26 21 kg/m²  ,     Systolic (04FRD), UEY:839 , Min:91 , DSJ:561     Diastolic (59JQW), HXN:53, Min:50, Max:67    Wt Readings from Last 3 Encounters:   02/15/21 80 5 kg (177 lb 7 5 oz)      Lab Results   Component Value Date    CREATININE 0 71 02/15/2021    CREATININE 0 78 02/14/2021             Intake/Output Summary (Last 24 hours) at 2/15/2021 0904  Last data filed at 2/15/2021 0401  Gross per 24 hour   Intake 480 ml   Output 500 ml   Net -20 ml     Weight (last 2 days)     Date/Time   Weight    02/15/21 0600   80 5 (177 47)    02/14/21 2258   90 3 (199 08)            Invasive Devices     Peripheral Intravenous Line            Peripheral IV 02/14/21 Left Forearm less than 1 day                  Physical Exam  Vitals signs and nursing note reviewed  Constitutional:       General: He is not in acute distress  Appearance: He is well-developed  Comments: On RA in NAD   HENT:      Head: Normocephalic and atraumatic     Neck:      Musculoskeletal: Normal range of motion and neck supple  Vascular: No JVD  Cardiovascular:      Rate and Rhythm: Normal rate and regular rhythm  Heart sounds: Normal heart sounds  No murmur  No friction rub  No gallop  Pulmonary:      Effort: Pulmonary effort is normal  No respiratory distress  Breath sounds: Normal breath sounds  No wheezing or rales  Chest:      Chest wall: No tenderness  Abdominal:      General: Bowel sounds are normal  There is no distension  Palpations: Abdomen is soft  Tenderness: There is no abdominal tenderness  Musculoskeletal: Normal range of motion  General: No tenderness  Right lower leg: No edema  Left lower leg: No edema  Comments: Pain to palpation over chest wall that is worse with moving his left arm   Skin:     General: Skin is warm and dry  Coloration: Skin is not pale  Findings: No erythema  Neurological:      Mental Status: He is alert and oriented to person, place, and time  Psychiatric:         Behavior: Behavior normal          Thought Content:  Thought content normal          Judgment: Judgment normal            LABORATORY RESULTS:  Results from last 7 days   Lab Units 02/15/21  0618 02/15/21  0055 02/14/21  2140   TROPONIN I ng/mL <0 02 0 02 <0 02     CBC with diff:   Results from last 7 days   Lab Units 02/15/21  0618 02/14/21  1454   WBC Thousand/uL 4 58 5 56   HEMOGLOBIN g/dL 13 5 13 7   HEMATOCRIT % 41 0 41 1   MCV fL 91 90   PLATELETS Thousands/uL 183 176   MCH pg 29 9 30 1   MCHC g/dL 32 9 33 3   RDW % 12 2 12 1   MPV fL 10 8 11 2   NRBC AUTO /100 WBCs 0 0       CMP:  Results from last 7 days   Lab Units 02/15/21  0618 02/14/21  1454   POTASSIUM mmol/L 3 8 4 1   CHLORIDE mmol/L 106 105   CO2 mmol/L 28 28   BUN mg/dL 12 12   CREATININE mg/dL 0 71 0 78   CALCIUM mg/dL 9 8 9 8   AST U/L  --  29   ALT U/L  --  38   ALK PHOS U/L  --  103   EGFR ml/min/1 73sq m 127 106       BMP:  Results from last 7 days   Lab Units 02/15/21  0618 02/14/21  1454 POTASSIUM mmol/L 3 8 4 1   CHLORIDE mmol/L 106 105   CO2 mmol/L 28 28   BUN mg/dL 12 12   CREATININE mg/dL 0 71 0 78   CALCIUM mg/dL 9 8 9 8          No results found for: NTBNP    Lipid Profile:   No results found for: CHOL  Lab Results   Component Value Date    HDL 37 (L) 02/15/2021    HDL 39 (L) 02/14/2021     Lab Results   Component Value Date    LDLCALC 29 02/15/2021    LDLCALC 30 02/14/2021     Lab Results   Component Value Date    TRIG 57 02/15/2021    TRIG 72 02/14/2021         Cardiac testing:   No results found for this or any previous visit  No results found for this or any previous visit  No procedure found  No results found for this or any previous visit  Imaging: I have personally reviewed pertinent reports  Xr Chest 1 View Portable    Result Date: 2/15/2021  Narrative: CHEST INDICATION:   Chest pain, anxiety  COMPARISON:  None EXAM PERFORMED/VIEWS:  XR CHEST PORTABLE FINDINGS: Cardiomediastinal silhouette appears unremarkable  The lungs are clear  No pneumothorax or pleural effusion  Osseous structures appear within normal limits for patient age  Impression: No acute cardiopulmonary disease  Workstation performed: [id number]     Cta Chest Pe Study    Result Date: 2/14/2021  Narrative: CTA - CHEST WITH IV CONTRAST - PULMONARY ANGIOGRAM INDICATION:   Retrosternal chest pain  D-dimer 0 90  COMPARISON: Plain film portable chest radiograph performed today  TECHNIQUE: CTA examination of the chest was performed using angiographic technique according to a protocol specifically tailored to evaluate for pulmonary embolism  Axial, sagittal, and coronal 2D reformatted images were created from the source data and  submitted for interpretation  In addition, coronal 3D MIP postprocessing was performed on the acquisition scanner  Radiation dose length product (DLP) for this visit:  480 mGy-cm     This examination, like all CT scans performed in the [facility], was performed utilizing techniques to minimize radiation dose exposure, including the use of iterative reconstruction and automated exposure control  IV Contrast:  85 mL of iohexol (OMNIPAQUE)  FINDINGS: PULMONARY ARTERIAL TREE:  No pulmonary embolus is seen  LUNGS:  Lungs are clear  There is no tracheal or endobronchial lesion  PLEURA:  Unremarkable  HEART/GREAT VESSELS:  Unremarkable for patient's age  MEDIASTINUM AND MILTON:  Unremarkable  CHEST WALL AND LOWER NECK:   Unremarkable  VISUALIZED STRUCTURES IN THE UPPER ABDOMEN:  Unremarkable  OSSEOUS STRUCTURES:  No acute fracture or destructive osseous lesion  Impression: No evidence of pulmonary embolism is seen  Workstation performed: [id number]           Counseling / Coordination of Care  Total floor / unit time spent today 45 minutes  Greater than 50% of total time was spent with the patient and / or family counseling and / or coordination of care  A description of the counseling / coordination of care: Review of history, current assessment, development of a plan  Code Status: Level 1 - Full Code    ** Please Note: Dragon 360 Dictation voice to text software may have been used in the creation of this document   **

## 2021-02-15 NOTE — UTILIZATION REVIEW
Initial Clinical Review    Admission: Date/Time/Statement:   Admission Orders (From admission, onward)     Ordered        02/14/21 1554  Place in Observation  Once                   Orders Placed This Encounter   Procedures    Place in Observation     Standing Status:   Standing     Number of Occurrences:   1     Order Specific Question:   Level of Care     Answer:   Med Surg [16]     ED Arrival Information     Expected Arrival Acuity Means of Arrival Escorted By Service Admission Type    - 2/14/2021 13:53 Emergent Ambulance [address] Emergency    Arrival Complaint    anxiety        Chief Complaint   Patient presents with    Chest Pain     Per EMS pt found in [facility] clutching his chest  While walking to the ambulance pt began screaming and screeching  Pt given 2mg of ativan by EMS  Pt reports resolved cp  Denies history   Anxiety     Assessment/Plan: 53 yo male with hx ofseizures, HTN,HLD,cocaine use in early remission and asthma presents to ED from community via EMS with chest pain at rest, L sided 10/10 radiated to his back  Pt states this happened x 2 last wk also and pulled a muscle a few weeks ago  Pt given ativan by EMS crew as pt screamin gwhile on way to get into ambulance   Physical exam WNL except pt hypersomnolent  Labs show initial troponin negative, D Dimer elevated  CT Chest neg  For PE  Pt admitted as OBS with chest pain to telemetry  Plan includes telemetry, trend troponins, stress test, baby ASA , lipid panel, Hgb A1c, continue home meds for seizures, HTN, HLD  2/15 Troponin neg x 3, ECG showed progression with T-wave inversions in II, III, V4, V5, V6 however no STEMI  Cardiology suggests ECHO be obtained  Cardiology-pain is reproducible to palpation, worse with deep breath and movement L arm, likely represents MSK pain  ECG showed NSR with inferolateral T wave abnormality  ECHO and stress test pending  If studies unremarkable, no further workup indicated   Plan to continue ASA , BB and statin    ED Triage Vitals [02/14/21 1357]   Temperature Pulse Respirations Blood Pressure SpO2   98 7 °F (37 1 °C) 64 16 112/67 100 %      Temp Source Heart Rate Source Patient Position - Orthostatic VS BP Location FiO2 (%)   Oral Monitor Lying Right arm --      Pain Score       No Pain          Wt Readings from Last 1 Encounters:   02/15/21 80 5 kg (177 lb 7 5 oz)     Additional Vital Signs:   Date/Time  Temp  Pulse  Resp  BP  MAP (mmHg)  SpO2    02/15/21 0700  98 3 °F (36 8 °C)  65  18  91/50  65  97 %    02/15/21 0300  98 3 °F (36 8 °C)  61  22  112/60  81  92 %    02/15/21 0100  --  74  16  113/57  80  95 %    02/14/21 2317  --  --  --  --  --  --    02/14/21 2300  --  70  16  106/63  80  95 %    02/14/21 1945  --  65  16  128/58  84  100 %        Pertinent Labs/Diagnostic Test Results:   ecg 2/14  Normal sinus rhythm  Nonspecific T wave abnormality  Abnormal ECG  2/14 CT chest-PE study  No evidence of pulmonary embolism is seen  2/14 CXR  No acute cardiopulmonary disease      Results from last 7 days   Lab Units 02/15/21  0200   SARS-COV-2  Negative     Results from last 7 days   Lab Units 02/15/21  0618 02/14/21  1454   WBC Thousand/uL 4 58 5 56   HEMOGLOBIN g/dL 13 5 13 7   HEMATOCRIT % 41 0 41 1   PLATELETS Thousands/uL 183 176   NEUTROS ABS Thousands/µL 2 18 2 99         Results from last 7 days   Lab Units 02/15/21  0618 02/14/21  1454   SODIUM mmol/L 142 139   POTASSIUM mmol/L 3 8 4 1   CHLORIDE mmol/L 106 105   CO2 mmol/L 28 28   ANION GAP mmol/L 8 6   BUN mg/dL 12 12   CREATININE mg/dL 0 71 0 78   EGFR ml/min/1 73sq m 127 106   CALCIUM mg/dL 9 8 9 8     Results from last 7 days   Lab Units 02/14/21  1454   AST U/L 29   ALT U/L 38   ALK PHOS U/L 103   TOTAL PROTEIN g/dL 7 4   ALBUMIN g/dL 3 8   TOTAL BILIRUBIN mg/dL 0 37         Results from last 7 days   Lab Units 02/15/21  0618 02/14/21  1454   GLUCOSE RANDOM mg/dL 90 92         Results from last 7 days   Lab Units 02/15/21  0618 HEMOGLOBIN A1C % 5 5   EAG mg/dl 111       Results from last 7 days   Lab Units 02/15/21  0618 02/15/21  0055 02/14/21  2140 02/14/21  1454   TROPONIN I ng/mL <0 02 0 02 <0 02 <0 02     Results from last 7 days   Lab Units 02/15/21  0618 02/14/21  1749   D-DIMER QUANTITATIVE ug/ml FEU 1 05* 0 90*         Results from last 7 days   Lab Units 02/15/21  0200   INFLUENZA A PCR  Negative   INFLUENZA B PCR  Negative   RSV PCR  Negative         Results from last 7 days   Lab Units 02/14/21 2031   AMPH/METH  Negative   BARBITURATE UR  Negative   BENZODIAZEPINE UR  Negative   COCAINE UR  Negative   METHADONE URINE  Negative   OPIATE UR  Negative   PCP UR  Negative   THC UR  Negative         ED Treatment:   Medication Administration from 02/14/2021 1353 to 02/15/2021 0204       Date/Time Order Dose Route Action     02/14/2021 1949 LORazepam (FOR EMS ONLY) (ATIVAN) 2 mg/mL injection 2 mg 0 mg Does not apply Given to EMS     02/14/2021 1428 aspirin chewable tablet 324 mg 324 mg Oral Given     02/14/2021 2049 levETIRAcetam (KEPPRA) tablet 750 mg 750 mg Oral Given     02/14/2021 2049 valproic acid (DEPAKENE) capsule 250 mg 250 mg Oral Given     02/14/2021 2144 atorvastatin (LIPITOR) tablet 40 mg 40 mg Oral Given     02/14/2021 2115 iohexol (OMNIPAQUE) 350 MG/ML injection (MULTI-DOSE) 85 mL 85 mL Intravenous Given        Past Medical History:   Diagnosis Date    Asthma     Hypertension     Seizure ([facility] [address] )      Present on Admission:   Chest pain   Hyperlipidemia      Admitting Diagnosis:  Anxiety [F41 9]  HTN (hypertension) [I10]  Chest pain, unspecified type [R07 9]  Age/Sex: 54 y o  male  Admission Orders:  Scheduled Medications:  aspirin, 81 mg, Oral, Daily  atorvastatin, 40 mg, Oral, HS  carBAMazepine, 200 mg, Oral, Daily  carvedilol, 3 125 mg, Oral, Daily With Breakfast  levETIRAcetam, 750 mg, Oral, BID  losartan, 25 mg, Oral, Daily  valproic acid, 250 mg, Oral, BID      Continuous IV Infusions:none     PRN Meds:  acetaminophen, 650 mg, Oral, Q6H PRN  ondansetron, 4 mg, Intravenous, Q6H PRN    Telemetry  ECG with chest pain or ST seg changes  Seizure precautions  Daily wt  I/O  Ambulation  Cardiac diet, no caffeine /chocolate    IP CONSULT TO CARDIOLOGY    Network Utilization Review Department  ATTENTION: Please call with any questions or concerns to [phone] and carefully listen to the prompts so that you are directed to the right person  All voicemails are confidential   Fabiola Keith all requests for admission clinical reviews, approved or denied determinations and any other requests to dedicated fax number below belonging to the campus where the patient is receiving treatment   List of dedicated fax numbers for the Facilities:  [address] DENIALS (Administrative/Medical Necessity) [phone]   [address] (Maternity/NICU/Pediatrics) [phone] [address] [place] [address] Dr Annabelle Welch 2420 (  Gayathri Cheng "Cris" 103) [address] [address] Jade Almazan 1481 [address] [place]) [address] [place] [address] [phone]

## 2021-02-15 NOTE — PLAN OF CARE
Problem: Potential for Falls  Goal: Patient will remain free of falls  Description: INTERVENTIONS:  - Assess patient frequently for physical needs  -  Identify cognitive and physical deficits and behaviors that affect risk of falls    -  [place] fall precautions as indicated by assessment   - Educate patient/family on patient safety including physical limitations  - Instruct patient to call for assistance with activity based on assessment  - Modify environment to reduce risk of injury  - Consider OT/PT consult to assist with strengthening/mobility  Outcome: Progressing     Problem: PAIN - ADULT  Goal: Verbalizes/displays adequate comfort level or baseline comfort level  Description: Interventions:  - Encourage patient to monitor pain and request assistance  - Assess pain using appropriate pain scale  - Administer analgesics based on type and severity of pain and evaluate response  - Implement non-pharmacological measures as appropriate and evaluate response  - Consider cultural and social influences on pain and pain management  - Notify physician/advanced practitioner if interventions unsuccessful or patient reports new pain  Outcome: Progressing     Problem: INFECTION - ADULT  Goal: Absence or prevention of progression during hospitalization  Description: INTERVENTIONS:  - Assess and monitor for signs and symptoms of infection  - Monitor lab/diagnostic results  - Monitor all insertion sites, i e  indwelling lines, tubes, and drains  - Monitor endotracheal if appropriate and nasal secretions for changes in amount and color  - [place] appropriate cooling/warming therapies per order  - Administer medications as ordered  - Instruct and encourage patient and family to use good hand hygiene technique  - Identify and instruct in appropriate isolation precautions for identified infection/condition  Outcome: Progressing  Goal: Absence of fever/infection during neutropenic period  Description: INTERVENTIONS:  - Monitor WBC    Outcome: Progressing     Problem: SAFETY ADULT  Goal: Patient will remain free of falls  Description: INTERVENTIONS:  - Assess patient frequently for physical needs  -  Identify cognitive and physical deficits and behaviors that affect risk of falls    -  [place] fall precautions as indicated by assessment   - Educate patient/family on patient safety including physical limitations  - Instruct patient to call for assistance with activity based on assessment  - Modify environment to reduce risk of injury  - Consider OT/PT consult to assist with strengthening/mobility  Outcome: Progressing  Goal: Maintain or return to baseline ADL function  Description: INTERVENTIONS:  -  Assess patient's ability to carry out ADLs; assess patient's baseline for ADL function and identify physical deficits which impact ability to perform ADLs (bathing, care of mouth/teeth, toileting, grooming, dressing, etc )  - Assess/evaluate cause of self-care deficits   - Assess range of motion  - Assess patient's mobility; develop plan if impaired  - Assess patient's need for assistive devices and provide as appropriate  - Encourage maximum independence but intervene and supervise when necessary  - Involve family in performance of ADLs  - Assess for home care needs following discharge   - Consider OT consult to assist with ADL evaluation and planning for discharge  - Provide patient education as appropriate  Outcome: Progressing  Goal: Maintain or return mobility status to optimal level  Description: INTERVENTIONS:  - Assess patient's baseline mobility status (ambulation, transfers, stairs, etc )    - Identify cognitive and physical deficits and behaviors that affect mobility  - Identify mobility aids required to assist with transfers and/or ambulation (gait belt, sit-to-stand, lift, walker, cane, etc )  - [place] fall precautions as indicated by assessment  - Record patient progress and toleration of activity level on Mobility SBAR; progress patient to next Phase/Stage  - Instruct patient to call for assistance with activity based on assessment  - Consider rehabilitation consult to assist with strengthening/weightbearing, etc   Outcome: Progressing     Problem: DISCHARGE PLANNING  Goal: Discharge to home or other facility with appropriate resources  Description: INTERVENTIONS:  - Identify barriers to discharge w/patient and caregiver  - Arrange for needed discharge resources and transportation as appropriate  - Identify discharge learning needs (meds, wound care, etc )  - Arrange for interpretive services to assist at discharge as needed  - Refer to Case Management Department for coordinating discharge planning if the patient needs post-hospital services based on physician/advanced practitioner order or complex needs related to functional status, cognitive ability, or social support system  Outcome: Progressing     Problem: Knowledge Deficit  Goal: Patient/family/caregiver demonstrates understanding of disease process, treatment plan, medications, and discharge instructions  Description: Complete learning assessment and assess knowledge base  Interventions:  - Provide teaching at level of understanding  - Provide teaching via preferred learning methods  Outcome: Progressing     Problem: NEUROSENSORY - ADULT  Goal: Achieves maximal functionality and self care  Description: INTERVENTIONS  - Monitor swallowing and airway patency with patient fatigue and changes in neurological status  - Encourage and assist patient to increase activity and self care     - Encourage visually impaired, hearing impaired and aphasic patients to use assistive/communication devices  Outcome: Progressing     Problem: CARDIOVASCULAR - ADULT  Goal: Maintains optimal cardiac output and hemodynamic stability  Description: INTERVENTIONS:  - Monitor I/O, vital signs and rhythm  - Monitor for S/S and trends of decreased cardiac output  - Administer and titrate ordered vasoactive medications to optimize hemodynamic stability  - Assess quality of pulses, skin color and temperature  - Assess for signs of decreased coronary artery perfusion  - Instruct patient to report change in severity of symptoms  Outcome: Progressing  Goal: Absence of cardiac dysrhythmias or at baseline rhythm  Description: INTERVENTIONS:  - Continuous cardiac monitoring, vital signs, obtain 12 lead EKG if ordered  - Administer antiarrhythmic and heart rate control medications as ordered  - Monitor electrolytes and administer replacement therapy as ordered  Outcome: Progressing

## 2021-02-15 NOTE — PLAN OF CARE
Problem: Potential for Falls  Goal: Patient will remain free of falls  Description: INTERVENTIONS:  - Assess patient frequently for physical needs  -  Identify cognitive and physical deficits and behaviors that affect risk of falls    -  [place] fall precautions as indicated by assessment   - Educate patient/family on patient safety including physical limitations  - Instruct patient to call for assistance with activity based on assessment  - Modify environment to reduce risk of injury  - Consider OT/PT consult to assist with strengthening/mobility  Outcome: Progressing     Problem: PAIN - ADULT  Goal: Verbalizes/displays adequate comfort level or baseline comfort level  Description: Interventions:  - Encourage patient to monitor pain and request assistance  - Assess pain using appropriate pain scale  - Administer analgesics based on type and severity of pain and evaluate response  - Implement non-pharmacological measures as appropriate and evaluate response  - Consider cultural and social influences on pain and pain management  - Notify physician/advanced practitioner if interventions unsuccessful or patient reports new pain  Outcome: Progressing     Problem: INFECTION - ADULT  Goal: Absence or prevention of progression during hospitalization  Description: INTERVENTIONS:  - Assess and monitor for signs and symptoms of infection  - Monitor lab/diagnostic results  - Monitor all insertion sites, i e  indwelling lines, tubes, and drains  - Monitor endotracheal if appropriate and nasal secretions for changes in amount and color  - [place] appropriate cooling/warming therapies per order  - Administer medications as ordered  - Instruct and encourage patient and family to use good hand hygiene technique  - Identify and instruct in appropriate isolation precautions for identified infection/condition  Outcome: Progressing  Goal: Absence of fever/infection during neutropenic period  Description: INTERVENTIONS:  - Monitor WBC    Outcome: Progressing     Problem: SAFETY ADULT  Goal: Patient will remain free of falls  Description: INTERVENTIONS:  - Assess patient frequently for physical needs  -  Identify cognitive and physical deficits and behaviors that affect risk of falls    -  [place] fall precautions as indicated by assessment   - Educate patient/family on patient safety including physical limitations  - Instruct patient to call for assistance with activity based on assessment  - Modify environment to reduce risk of injury  - Consider OT/PT consult to assist with strengthening/mobility  Outcome: Progressing  Goal: Maintain or return to baseline ADL function  Description: INTERVENTIONS:  -  Assess patient's ability to carry out ADLs; assess patient's baseline for ADL function and identify physical deficits which impact ability to perform ADLs (bathing, care of mouth/teeth, toileting, grooming, dressing, etc )  - Assess/evaluate cause of self-care deficits   - Assess range of motion  - Assess patient's mobility; develop plan if impaired  - Assess patient's need for assistive devices and provide as appropriate  - Encourage maximum independence but intervene and supervise when necessary  - Involve family in performance of ADLs  - Assess for home care needs following discharge   - Consider OT consult to assist with ADL evaluation and planning for discharge  - Provide patient education as appropriate  Outcome: Progressing  Goal: Maintain or return mobility status to optimal level  Description: INTERVENTIONS:  - Assess patient's baseline mobility status (ambulation, transfers, stairs, etc )    - Identify cognitive and physical deficits and behaviors that affect mobility  - Identify mobility aids required to assist with transfers and/or ambulation (gait belt, sit-to-stand, lift, walker, cane, etc )  - [place] fall precautions as indicated by assessment  - Record patient progress and toleration of activity level on Mobility SBAR; progress patient to next Phase/Stage  - Instruct patient to call for assistance with activity based on assessment  - Consider rehabilitation consult to assist with strengthening/weightbearing, etc   Outcome: Progressing     Problem: DISCHARGE PLANNING  Goal: Discharge to home or other facility with appropriate resources  Description: INTERVENTIONS:  - Identify barriers to discharge w/patient and caregiver  - Arrange for needed discharge resources and transportation as appropriate  - Identify discharge learning needs (meds, wound care, etc )  - Arrange for interpretive services to assist at discharge as needed  - Refer to Case Management Department for coordinating discharge planning if the patient needs post-hospital services based on physician/advanced practitioner order or complex needs related to functional status, cognitive ability, or social support system  Outcome: Progressing     Problem: Knowledge Deficit  Goal: Patient/family/caregiver demonstrates understanding of disease process, treatment plan, medications, and discharge instructions  Description: Complete learning assessment and assess knowledge base  Interventions:  - Provide teaching at level of understanding  - Provide teaching via preferred learning methods  Outcome: Progressing     Problem: NEUROSENSORY - ADULT  Goal: Achieves maximal functionality and self care  Description: INTERVENTIONS  - Monitor swallowing and airway patency with patient fatigue and changes in neurological status  - Encourage and assist patient to increase activity and self care     - Encourage visually impaired, hearing impaired and aphasic patients to use assistive/communication devices  Outcome: Progressing     Problem: CARDIOVASCULAR - ADULT  Goal: Maintains optimal cardiac output and hemodynamic stability  Description: INTERVENTIONS:  - Monitor I/O, vital signs and rhythm  - Monitor for S/S and trends of decreased cardiac output  - Administer and titrate ordered vasoactive medications to optimize hemodynamic stability  - Assess quality of pulses, skin color and temperature  - Assess for signs of decreased coronary artery perfusion  - Instruct patient to report change in severity of symptoms  Outcome: Progressing  Goal: Absence of cardiac dysrhythmias or at baseline rhythm  Description: INTERVENTIONS:  - Continuous cardiac monitoring, vital signs, obtain 12 lead EKG if ordered  - Administer antiarrhythmic and heart rate control medications as ordered  - Monitor electrolytes and administer replacement therapy as ordered  Outcome: Progressing     Problem: RESPIRATORY - ADULT  Goal: Achieves optimal ventilation and oxygenation  Description: INTERVENTIONS:  - Assess for changes in respiratory status  - Assess for changes in mentation and behavior  - Position to facilitate oxygenation and minimize respiratory effort  - Oxygen administered by appropriate delivery if ordered  - Initiate smoking cessation education as indicated  - Encourage broncho-pulmonary hygiene including cough, deep breathe, Incentive Spirometry  - Assess the need for suctioning and aspirate as needed  - Assess and instruct to report SOB or any respiratory difficulty  - Respiratory Therapy support as indicated  Outcome: Progressing     Problem: GASTROINTESTINAL - ADULT  Goal: Maintains adequate nutritional intake  Description: INTERVENTIONS:  - Monitor percentage of each meal consumed  - Identify factors contributing to decreased intake, treat as appropriate  - Assist with meals as needed  - Monitor I&O, weight, and lab values if indicated  - Obtain nutrition services referral as needed  Outcome: Progressing     Problem: METABOLIC, FLUID AND ELECTROLYTES - ADULT  Goal: Electrolytes maintained within normal limits  Description: INTERVENTIONS:  - Monitor labs and assess patient for signs and symptoms of electrolyte imbalances  - Administer electrolyte replacement as ordered  - Monitor response to electrolyte replacements, including repeat lab results as appropriate  - Instruct patient on fluid and nutrition as appropriate  Outcome: Progressing  Goal: Fluid balance maintained  Description: INTERVENTIONS:  - Monitor labs   - Monitor I/O and WT  - Instruct patient on fluid and nutrition as appropriate  - Assess for signs & symptoms of volume excess or deficit  Outcome: Progressing     Problem: SKIN/TISSUE INTEGRITY - ADULT  Goal: Skin integrity remains intact  Description: INTERVENTIONS  - Identify patients at risk for skin breakdown  - Assess and monitor skin integrity  - Assess and monitor nutrition and hydration status  - Monitor labs (i e  albumin)  - Assess for incontinence   - Turn and reposition patient  - Assist with mobility/ambulation  - Relieve pressure over bony prominences  - Avoid friction and shearing  - Provide appropriate hygiene as needed including keeping skin clean and dry  - Evaluate need for skin moisturizer/barrier cream  - Collaborate with interdisciplinary team (i e  Nutrition, Rehabilitation, etc )   - Patient/family teaching  Outcome: Progressing

## 2021-02-15 NOTE — QUICK NOTE
Tropes negative x3  ECGs showed progression with T-wave inversions in II, III, V4, V5, V6 however no STEMI  Reached out to Cardiology who suggested echo be ordered  Nuclear medicine stress test already pending  No further intervention suggested

## 2021-02-15 NOTE — DISCHARGE INSTR - AVS FIRST PAGE
Dear Travis Gooden,     It was our pleasure to care for you here at [facility]  It is our hope that we were always able to exceed the expected standards for your care during your stay  You were hospitalized due to chest pain  You were cared for on the 3rd floor by Ewelina Alonzo MD under the service of Rissa Willis MD with the [facility] who covers for your primary care physician (PCP), No primary care provider on file  , while you were hospitalized  If you have any questions or concerns related to this hospitalization, you may contact us at [phone]  For follow up as well as any medication refills, we recommend that you follow up with your primary care physician  A registered nurse will reach out to you by phone within a few days after your discharge to answer any additional questions that you may have after going home  However, at this time we provide for you here, the most important instructions / recommendations at discharge:     · Notable Medication Adjustments -   · Please start taking valproic acid 500 mg twice a day (2 tablets in the AM and 2 tablets in the PM)  · Please start taking aspirin 81 mg 1 tablet daily  This can be found over the counter  · Testing Required after Discharge -   · None  · Important follow up information -   · Please follow-up with your primary care provider within the next week in regards to recent hospitalization  · Please follow-up with your neurologist to his managing your seizure medications as soon as possible  · Please review this entire after visit summary as additional general instructions including medication list, appointments, activity, diet, any pertinent wound care, and other additional recommendations from your care team that may be provided for you        Sincerely,     Ewelina Alonzo MD

## 2021-02-15 NOTE — ASSESSMENT & PLAN NOTE
Home medications include Keppra and valproic acid, unknown dosages  Called caregiver, who stated Keppra 750 mg b i d , valproic acid 250 mg b i d , carbamazepine 200 mg daily  Valproic acid level low on admission (22)  Our team reached out to Neurology, who recommended a 15 milligram/kilogram loading dose of valproic acid and recheck of valproic acid level 1 hour later  If levels are improved and increased, they recommend patient go home on 500 mg b i d  Plan:   Continue home medications with the exception of valproic acid, 500 mg b i d  Instead of 250 mg b i d

## 2021-02-15 NOTE — DISCHARGE SUMMARY
Discharge- Jolly Jose 1970, 54 y o  male MRN: [id number]    Unit/Bed#: S -01 Encounter: [id number]    Primary Care Provider: No primary care provider on file  Date and time admitted to hospital: 2/14/2021  1:54 PM        * Chest pain  Assessment & Plan  PMHx of seizures, HTN, and asthma, presented with, far left-sided, sharp, 10/10, chest pain that radiated to back and resolved after 20 minutes  Occurred in the a m , while sitting at [employer], no strenuous activity, no associated symptoms  Similar episodes twice last week  Patient stated he pulled a muscle couple weeks ago  Troponins negative x4  Elevated D-dimer  CTA chest PE was performed which showed no PE  Echocardiogram was performed on 02/15/2021, which showed EF of 50%, mild mitral valve regurgitation, mild tricuspid valve regurgitation, and mild pulmonic valve regurgitation  A pharmacologic stress test was performed, which was normal   Chest pain likely costochondritis    Plan:  · Continue with aspirin, beta-blocker, and statin  History of seizures  Assessment & Plan  Home medications include Keppra and valproic acid, unknown dosages  Called caregiver, who stated Keppra 750 mg b i d , valproic acid 250 mg b i d , carbamazepine 200 mg daily  Valproic acid level low on admission (22)  Our team reached out to Neurology, who recommended a 15 milligram/kilogram loading dose of valproic acid and recheck of valproic acid level 1 hour later  If levels are improved and increased, they recommend patient go home on 500 mg b i d  Plan:   Continue home medications with the exception of valproic acid, 500 mg b i d  Instead of 250 mg b i d  Elevated d-dimer  Assessment & Plan  CTA PE study negative for a PE    Cocaine use disorder, severe, in early remission [facility])  Assessment & Plan  Long crack cocaine abuse history, stopped 3 months ago, and 1 past psychiatric hospitalization after suicidal ideations to overdose on medications  Caregiver stated no psychiatric medications or diagnoses  Patient currently does not appear to be a harm to himself or to others and consents for safety  Hyperlipidemia  Assessment & Plan  Home medication atorvastatin 40 mg at bedtime, will continue    Asthma  Assessment & Plan  Patient stated no medications currently    Plan:   Continue home medications    HTN (hypertension)  Assessment & Plan  Home medications include "BP and water pill"  Caregivers states carvedilol 3 125 mg daily and losartan 25 mg daily    Plan:   Continue home medications  Discharging Resident Physician: Danni Daniels MD  Attending: Ki Castrejon MD  PCP: No primary care provider on file  Admission Date: 2/14/2021  Discharge Date: 02/15/21    Disposition:     Home    Reason for Admission:  Chest pain    Consultations During Hospital Stay:  · Cardiology    Procedures Performed:     · None    Significant Findings / Test Results:     · Low valproic acid level but normalized after valproic acid loading dose  · COVID-19 negative on 02/15/2021  · Troponin negative x4  · Elevated D-dimer  · Urine rapid drug screen normal  · Echocardiogram done on 02/15/2021 shows estimated EF to be 50%, mild mitral valve regurgitation, trace aortic valve regurgitation, mild tricuspid valve regurgitation, mild pulmonic valve regurgitation  · Pharmacologic stress test done on 02/15/2021 is a normal study after pharmacologic stress without reproduction of symptoms  Incidental Findings:   · None     Test Results Pending at Discharge (will require follow up): · None     Outpatient Tests Requested:  · None    Complications:  None    Hospital Course:     Vianey Vega is a 54 y o  male patient who originally presented to the hospital on 2/14/2021 due to chest pain  PMH significant for HTN, HLD, asthma, seizure disorder  Patient noted that pain was worse with deep inspiration and radiated to his back    Patient noted that the pain was at the center of his chest and also radiated to his left hand  Troponins were negative x4  EKG showed normal sinus rhythm with nonspecific T-wave abnormality  Patient's D-dimer was elevated  Patient underwent a CTA chest PE study which was normal   Patient's echocardiogram results are above  A pharmacologic stress test was done which was normal   Our team recommends that patient start taking an aspirin 81 mg daily  On admission it was also noted that patient's valproic acid level was low  We went ahead and provided patient with a loading dose of valproic acid and recheck the level an hour later which was then therapeutic  We recommend that patient increase his valproic acid to 500 mg b i d     We also recommend that he follow-up closely with his neurologist to his managing his antiepileptic medications  Condition at Discharge: stable     Discharge Day Visit / Exam:     Subjective:  No acute events overnight  Patient states that he does not have any chest pain at this point  He notes that his chest is somewhat sore  He denies any shortness of breath  Vitals: Blood Pressure: 121/59 (02/15/21 1514)  Pulse: 68 (02/15/21 1514)  Temperature: 98 5 °F (36 9 °C) (02/15/21 1514)  Temp Source: Oral (02/15/21 1514)  Respirations: 18 (02/15/21 1514)  Height: 5' 9" (175 3 cm) (02/14/21 2258)  Weight - Scale: 80 5 kg (177 lb 7 5 oz) (02/15/21 0600)  SpO2: 97 % (02/15/21 1514)  Exam:   Physical Exam  Vitals signs and nursing note reviewed  Constitutional:       Appearance: He is well-developed  He is not diaphoretic  HENT:      Head: Normocephalic and atraumatic  Mouth/Throat:      Comments: Poor dentition  Eyes:      General: No scleral icterus  Conjunctiva/sclera: Conjunctivae normal       Pupils: Pupils are equal, round, and reactive to light  Neck:      Musculoskeletal: Neck supple  Thyroid: No thyromegaly  Vascular: No JVD  Cardiovascular:      Rate and Rhythm: Normal rate and regular rhythm        Heart sounds: Normal heart sounds  No murmur  No friction rub  No gallop  Pulmonary:      Effort: Pulmonary effort is normal  No respiratory distress  Breath sounds: Normal breath sounds  No wheezing  Chest:      Comments: Pain with palpation  Abdominal:      General: Bowel sounds are normal  There is no distension  Palpations: Abdomen is soft  Tenderness: There is no abdominal tenderness  Musculoskeletal: Normal range of motion  Right lower leg: No edema  Left lower leg: No edema  Lymphadenopathy:      Cervical: No cervical adenopathy  Skin:     General: Skin is warm and dry  Capillary Refill: Capillary refill takes less than 2 seconds  Coloration: Skin is not pale  Findings: No erythema  Neurological:      General: No focal deficit present  Mental Status: He is alert and oriented to person, place, and time  Psychiatric:         Mood and Affect: Mood normal          Behavior: Behavior normal        Discussion with Family:  Plan of care was discussed with patient's caregiver  All questions were answered  Discharge instructions/Information to patient and family:   See after visit summary for information provided to patient and family  Provisions for Follow-Up Care:  See after visit summary for information related to follow-up care and any pertinent home health orders  Planned Readmission:  No     Discharge Medications:  See after visit summary for reconciled discharge medications provided to patient and family        ** Please Note: This note has been constructed using a voice recognition system **

## 2021-02-15 NOTE — ASSESSMENT & PLAN NOTE
PMHx of seizures, HTN, and asthma, presented with, far left-sided, sharp, 10/10, chest pain that radiated to back and resolved after 20 minutes  Occurred in the a m , while sitting at [employer], no strenuous activity, no associated symptoms  Similar episodes twice last week  Patient stated he pulled a muscle couple weeks ago  Troponins negative x4  Elevated D-dimer  CTA chest PE was performed which showed no PE  Echocardiogram was performed on 02/15/2021, which showed EF of 50%, mild mitral valve regurgitation, mild tricuspid valve regurgitation, and mild pulmonic valve regurgitation  A pharmacologic stress test was performed, which was normal   Chest pain likely costochondritis    Plan:  · Continue with aspirin, beta-blocker, and statin

## 2021-02-15 NOTE — ASSESSMENT & PLAN NOTE
Long crack cocaine abuse history, stopped 3 months ago, and 1 past psychiatric hospitalization after suicidal ideations to overdose on medications  Caregiver stated no psychiatric medications or diagnoses  Patient currently does not appear to be a harm to himself or to others and consents for safety

## 2021-02-15 NOTE — PLAN OF CARE
Problem: Potential for Falls  Goal: Patient will remain free of falls  Description: INTERVENTIONS:  - Assess patient frequently for physical needs  -  Identify cognitive and physical deficits and behaviors that affect risk of falls    -  [place] fall precautions as indicated by assessment   - Educate patient/family on patient safety including physical limitations  - Instruct patient to call for assistance with activity based on assessment  - Modify environment to reduce risk of injury  - Consider OT/PT consult to assist with strengthening/mobility  Outcome: Adequate for Discharge     Problem: PAIN - ADULT  Goal: Verbalizes/displays adequate comfort level or baseline comfort level  Description: Interventions:  - Encourage patient to monitor pain and request assistance  - Assess pain using appropriate pain scale  - Administer analgesics based on type and severity of pain and evaluate response  - Implement non-pharmacological measures as appropriate and evaluate response  - Consider cultural and social influences on pain and pain management  - Notify physician/advanced practitioner if interventions unsuccessful or patient reports new pain  Outcome: Adequate for Discharge     Problem: INFECTION - ADULT  Goal: Absence or prevention of progression during hospitalization  Description: INTERVENTIONS:  - Assess and monitor for signs and symptoms of infection  - Monitor lab/diagnostic results  - Monitor all insertion sites, i e  indwelling lines, tubes, and drains  - Monitor endotracheal if appropriate and nasal secretions for changes in amount and color  - [place] appropriate cooling/warming therapies per order  - Administer medications as ordered  - Instruct and encourage patient and family to use good hand hygiene technique  - Identify and instruct in appropriate isolation precautions for identified infection/condition  Outcome: Adequate for Discharge  Goal: Absence of fever/infection during neutropenic period  Description: INTERVENTIONS:  - Monitor WBC    Outcome: Adequate for Discharge     Problem: SAFETY ADULT  Goal: Patient will remain free of falls  Description: INTERVENTIONS:  - Assess patient frequently for physical needs  -  Identify cognitive and physical deficits and behaviors that affect risk of falls    -  [place] fall precautions as indicated by assessment   - Educate patient/family on patient safety including physical limitations  - Instruct patient to call for assistance with activity based on assessment  - Modify environment to reduce risk of injury  - Consider OT/PT consult to assist with strengthening/mobility  Outcome: Adequate for Discharge  Goal: Maintain or return to baseline ADL function  Description: INTERVENTIONS:  -  Assess patient's ability to carry out ADLs; assess patient's baseline for ADL function and identify physical deficits which impact ability to perform ADLs (bathing, care of mouth/teeth, toileting, grooming, dressing, etc )  - Assess/evaluate cause of self-care deficits   - Assess range of motion  - Assess patient's mobility; develop plan if impaired  - Assess patient's need for assistive devices and provide as appropriate  - Encourage maximum independence but intervene and supervise when necessary  - Involve family in performance of ADLs  - Assess for home care needs following discharge   - Consider OT consult to assist with ADL evaluation and planning for discharge  - Provide patient education as appropriate  Outcome: Adequate for Discharge  Goal: Maintain or return mobility status to optimal level  Description: INTERVENTIONS:  - Assess patient's baseline mobility status (ambulation, transfers, stairs, etc )    - Identify cognitive and physical deficits and behaviors that affect mobility  - Identify mobility aids required to assist with transfers and/or ambulation (gait belt, sit-to-stand, lift, walker, cane, etc )  - Robards fall precautions as indicated by assessment  - Record patient progress and toleration of activity level on Mobility SBAR; progress patient to next Phase/Stage  - Instruct patient to call for assistance with activity based on assessment  - Consider rehabilitation consult to assist with strengthening/weightbearing, etc   Outcome: Adequate for Discharge     Problem: DISCHARGE PLANNING  Goal: Discharge to home or other facility with appropriate resources  Description: INTERVENTIONS:  - Identify barriers to discharge w/patient and caregiver  - Arrange for needed discharge resources and transportation as appropriate  - Identify discharge learning needs (meds, wound care, etc )  - Arrange for interpretive services to assist at discharge as needed  - Refer to Case Management Department for coordinating discharge planning if the patient needs post-hospital services based on physician/advanced practitioner order or complex needs related to functional status, cognitive ability, or social support system  Outcome: Adequate for Discharge     Problem: Knowledge Deficit  Goal: Patient/family/caregiver demonstrates understanding of disease process, treatment plan, medications, and discharge instructions  Description: Complete learning assessment and assess knowledge base  Interventions:  - Provide teaching at level of understanding  - Provide teaching via preferred learning methods  Outcome: Adequate for Discharge     Problem: NEUROSENSORY - ADULT  Goal: Achieves maximal functionality and self care  Description: INTERVENTIONS  - Monitor swallowing and airway patency with patient fatigue and changes in neurological status  - Encourage and assist patient to increase activity and self care     - Encourage visually impaired, hearing impaired and aphasic patients to use assistive/communication devices  Outcome: Adequate for Discharge     Problem: CARDIOVASCULAR - ADULT  Goal: Maintains optimal cardiac output and hemodynamic stability  Description: INTERVENTIONS:  - Monitor I/O, vital signs and rhythm  - Monitor for S/S and trends of decreased cardiac output  - Administer and titrate ordered vasoactive medications to optimize hemodynamic stability  - Assess quality of pulses, skin color and temperature  - Assess for signs of decreased coronary artery perfusion  - Instruct patient to report change in severity of symptoms  Outcome: Adequate for Discharge  Goal: Absence of cardiac dysrhythmias or at baseline rhythm  Description: INTERVENTIONS:  - Continuous cardiac monitoring, vital signs, obtain 12 lead EKG if ordered  - Administer antiarrhythmic and heart rate control medications as ordered  - Monitor electrolytes and administer replacement therapy as ordered  Outcome: Adequate for Discharge     Problem: RESPIRATORY - ADULT  Goal: Achieves optimal ventilation and oxygenation  Description: INTERVENTIONS:  - Assess for changes in respiratory status  - Assess for changes in mentation and behavior  - Position to facilitate oxygenation and minimize respiratory effort  - Oxygen administered by appropriate delivery if ordered  - Initiate smoking cessation education as indicated  - Encourage broncho-pulmonary hygiene including cough, deep breathe, Incentive Spirometry  - Assess the need for suctioning and aspirate as needed  - Assess and instruct to report SOB or any respiratory difficulty  - Respiratory Therapy support as indicated  Outcome: Adequate for Discharge     Problem: GASTROINTESTINAL - ADULT  Goal: Maintains adequate nutritional intake  Description: INTERVENTIONS:  - Monitor percentage of each meal consumed  - Identify factors contributing to decreased intake, treat as appropriate  - Assist with meals as needed  - Monitor I&O, weight, and lab values if indicated  - Obtain nutrition services referral as needed  Outcome: Adequate for Discharge     Problem: METABOLIC, FLUID AND ELECTROLYTES - ADULT  Goal: Electrolytes maintained within normal limits  Description: INTERVENTIONS:  - Monitor labs and assess patient for signs and symptoms of electrolyte imbalances  - Administer electrolyte replacement as ordered  - Monitor response to electrolyte replacements, including repeat lab results as appropriate  - Instruct patient on fluid and nutrition as appropriate  Outcome: Adequate for Discharge  Goal: Fluid balance maintained  Description: INTERVENTIONS:  - Monitor labs   - Monitor I/O and WT  - Instruct patient on fluid and nutrition as appropriate  - Assess for signs & symptoms of volume excess or deficit  Outcome: Adequate for Discharge     Problem: SKIN/TISSUE INTEGRITY - ADULT  Goal: Skin integrity remains intact  Description: INTERVENTIONS  - Identify patients at risk for skin breakdown  - Assess and monitor skin integrity  - Assess and monitor nutrition and hydration status  - Monitor labs (i e  albumin)  - Assess for incontinence   - Turn and reposition patient  - Assist with mobility/ambulation  - Relieve pressure over bony prominences  - Avoid friction and shearing  - Provide appropriate hygiene as needed including keeping skin clean and dry  - Evaluate need for skin moisturizer/barrier cream  - Collaborate with interdisciplinary team (i e  Nutrition, Rehabilitation, etc )   - Patient/family teaching  Outcome: Adequate for Discharge

## 2021-02-15 NOTE — NURSING NOTE
Discharge documentation reviewed with patient and his two brothers  All questions were answered regarding changes to medications and smoking cessation    Luis Fernando Sellers RN

## 2021-02-19 LAB
ATRIAL RATE: 60 BPM
ATRIAL RATE: 75 BPM
P AXIS: 46 DEGREES
P AXIS: 57 DEGREES
PR INTERVAL: 168 MS
PR INTERVAL: 184 MS
QRS AXIS: -8 DEGREES
QRS AXIS: -9 DEGREES
QRSD INTERVAL: 90 MS
QRSD INTERVAL: 94 MS
QT INTERVAL: 398 MS
QT INTERVAL: 412 MS
QTC INTERVAL: 412 MS
QTC INTERVAL: 444 MS
T WAVE AXIS: -24 DEGREES
T WAVE AXIS: 257 DEGREES
VENTRICULAR RATE: 60 BPM
VENTRICULAR RATE: 75 BPM

## 2021-02-19 PROCEDURE — 93010 ELECTROCARDIOGRAM REPORT: CPT | Performed by: INTERNAL MEDICINE

## 2021-02-26 LAB
CHEST PAIN STATEMENT: NORMAL
MAX DIASTOLIC BP: 84 MMHG
MAX HEART RATE: 141 BPM
MAX PREDICTED HEART RATE: 171 BPM
MAX. SYSTOLIC BP: 122 MMHG
PROTOCOL NAME: NORMAL
REASON FOR TERMINATION: NORMAL
TARGET HR FORMULA: NORMAL
TEST INDICATION: NORMAL
TIME IN EXERCISE PHASE: NORMAL